# Patient Record
Sex: FEMALE | Race: WHITE
[De-identification: names, ages, dates, MRNs, and addresses within clinical notes are randomized per-mention and may not be internally consistent; named-entity substitution may affect disease eponyms.]

---

## 2021-11-24 PROBLEM — Z00.00 ENCOUNTER FOR PREVENTIVE HEALTH EXAMINATION: Status: ACTIVE | Noted: 2021-11-24

## 2021-11-29 ENCOUNTER — NON-APPOINTMENT (OUTPATIENT)
Age: 41
End: 2021-11-29

## 2021-11-29 ENCOUNTER — APPOINTMENT (OUTPATIENT)
Dept: HEART AND VASCULAR | Facility: CLINIC | Age: 41
End: 2021-11-29
Payer: COMMERCIAL

## 2021-11-29 VITALS
HEART RATE: 74 BPM | DIASTOLIC BLOOD PRESSURE: 85 MMHG | BODY MASS INDEX: 25.34 KG/M2 | SYSTOLIC BLOOD PRESSURE: 145 MMHG | WEIGHT: 143 LBS | HEIGHT: 63 IN

## 2021-11-29 VITALS — DIASTOLIC BLOOD PRESSURE: 100 MMHG | SYSTOLIC BLOOD PRESSURE: 160 MMHG

## 2021-11-29 DIAGNOSIS — H34.8192 CENTRAL RETINAL VEIN OCCLUSION, UNSPECIFIED EYE, STABLE: ICD-10-CM

## 2021-11-29 DIAGNOSIS — I10 ESSENTIAL (PRIMARY) HYPERTENSION: ICD-10-CM

## 2021-11-29 DIAGNOSIS — R06.00 DYSPNEA, UNSPECIFIED: ICD-10-CM

## 2021-11-29 PROCEDURE — 99204 OFFICE O/P NEW MOD 45 MIN: CPT

## 2021-11-29 PROCEDURE — 93000 ELECTROCARDIOGRAM COMPLETE: CPT

## 2021-11-29 PROCEDURE — ZZZZZ: CPT

## 2021-11-29 PROCEDURE — 93306 TTE W/DOPPLER COMPLETE: CPT

## 2021-11-29 RX ORDER — AMLODIPINE BESYLATE 5 MG/1
5 TABLET ORAL DAILY
Qty: 30 | Refills: 3 | Status: ACTIVE | COMMUNITY
Start: 2021-11-29 | End: 1900-01-01

## 2021-11-29 NOTE — HISTORY OF PRESENT ILLNESS
[FreeTextEntry1] : Patient is a 41-year-old white female currently on birth control pills who had abrupt onset of blurry vision in the right eye and was diagnosed with what appears to be a retinal vein occlusion and is under treatment of an ophthalmologist.  She was also noted to be transiently hypertensive and was referred for cardiac evaluation.  Patient is a non-smoker nondiabetic with no history of gestational hypertension.  Patient has had prior pregnancies with 2 miscarriages of unknown etiology.  There is no family history of thrombophilia DVT or pulmonary emboli.

## 2021-11-29 NOTE — ASSESSMENT
[FreeTextEntry1] : Lvef 55% MILD TR MR \par NO LVH \par Ekg normal \par will start norvasc 5mg \par Consider thrombophilia evaluation \par stop bcp \par \par